# Patient Record
Sex: MALE | Race: WHITE | NOT HISPANIC OR LATINO | Employment: FULL TIME | URBAN - METROPOLITAN AREA
[De-identification: names, ages, dates, MRNs, and addresses within clinical notes are randomized per-mention and may not be internally consistent; named-entity substitution may affect disease eponyms.]

---

## 2020-10-23 ENCOUNTER — APPOINTMENT (OUTPATIENT)
Dept: RADIOLOGY | Facility: MEDICAL CENTER | Age: 55
End: 2020-10-23
Attending: EMERGENCY MEDICINE
Payer: COMMERCIAL

## 2020-10-23 ENCOUNTER — HOSPITAL ENCOUNTER (EMERGENCY)
Facility: MEDICAL CENTER | Age: 55
End: 2020-10-23
Attending: EMERGENCY MEDICINE
Payer: COMMERCIAL

## 2020-10-23 VITALS
HEIGHT: 75 IN | TEMPERATURE: 97.9 F | WEIGHT: 280 LBS | DIASTOLIC BLOOD PRESSURE: 71 MMHG | RESPIRATION RATE: 18 BRPM | BODY MASS INDEX: 34.82 KG/M2 | OXYGEN SATURATION: 93 % | HEART RATE: 63 BPM | SYSTOLIC BLOOD PRESSURE: 146 MMHG

## 2020-10-23 DIAGNOSIS — J98.8 VIRAL RESPIRATORY INFECTION: ICD-10-CM

## 2020-10-23 DIAGNOSIS — B97.89 VIRAL RESPIRATORY INFECTION: ICD-10-CM

## 2020-10-23 DIAGNOSIS — Z20.822 SUSPECTED COVID-19 VIRUS INFECTION: ICD-10-CM

## 2020-10-23 LAB
COVID ORDER STATUS COVID19: NORMAL
EKG IMPRESSION: NORMAL
SARS-COV-2 RNA RESP QL NAA+PROBE: DETECTED
SPECIMEN SOURCE: ABNORMAL

## 2020-10-23 PROCEDURE — 99284 EMERGENCY DEPT VISIT MOD MDM: CPT

## 2020-10-23 PROCEDURE — 93005 ELECTROCARDIOGRAM TRACING: CPT | Performed by: EMERGENCY MEDICINE

## 2020-10-23 PROCEDURE — C9803 HOPD COVID-19 SPEC COLLECT: HCPCS | Performed by: EMERGENCY MEDICINE

## 2020-10-23 PROCEDURE — U0003 INFECTIOUS AGENT DETECTION BY NUCLEIC ACID (DNA OR RNA); SEVERE ACUTE RESPIRATORY SYNDROME CORONAVIRUS 2 (SARS-COV-2) (CORONAVIRUS DISEASE [COVID-19]), AMPLIFIED PROBE TECHNIQUE, MAKING USE OF HIGH THROUGHPUT TECHNOLOGIES AS DESCRIBED BY CMS-2020-01-R: HCPCS

## 2020-10-23 PROCEDURE — 71045 X-RAY EXAM CHEST 1 VIEW: CPT

## 2020-10-24 NOTE — ED NOTES
Pt reports being ill for the last 5 days with cough, slight sob, fever, chills, and body aches. Pt vss. SPO2 95% on RA. Pt placed on monitors and has no unmet needs. Call light within reach and pt updated on care.

## 2020-10-24 NOTE — ED TRIAGE NOTES
"Pt has been traveling between [facility] and [place] [employer]. He for about 1 week has been experiencing chest tightness, SOB, and chills. He has surgery on his Left lung about 1 year ago and states he is high risk.   /88   Pulse 73   Temp 36.3 °C (97.3 °F) (Temporal)   Resp 17   Ht 1.905 m (6' 3\")   Wt (!) 127 kg (280 lb)   SpO2 93%   BMI 35.00 kg/m²   EKG called and patient isolated from other patrons wearing a mask.   Told to inform staff of any change in condition.     "

## 2020-10-24 NOTE — ED PROVIDER NOTES
"ED Provider Note    CHIEF COMPLAINT  Chief Complaint   Patient presents with   • Cough   • Shortness of Breath   • Chills       HPI  Jonh Kendall Greene is a 55 y.o. male who presents with cough, shortness of breath, body aches, chills, change in taste, sore throat over about 5 days now (symptoms started on Sunday, October 18).  Patient has been unable to check his temperature though has subjective fever.  No recent known sick contacts.  He has been out hunting for about 2 weeks now.  He is from the [facility].  He was instructed by his primary care physician in [place] to come to the hospital to get checked due to his symptoms.  Patient denies smoking or cardiovascular disease history.  No history of diabetes.  Does have a history of pneumonia last year.  No abdominal pain, nausea, vomiting, diarrhea.    I verified that the patient was wearing a mask and I was wearing appropriate PPE every time I entered the room. The patient's mask was on the patient at all times during my encounter except for a brief view of the oropharynx.    REVIEW OF SYSTEMS  See HPI for further details. All other systems are negative.     PAST MEDICAL HISTORY       SOCIAL HISTORY  Social History     Tobacco Use   • Smoking status: Never Smoker   • Smokeless tobacco: Never Used   Substance and Sexual Activity   • Alcohol use: Not Currently   • Drug use: Never   • Sexual activity: Not on file       SURGICAL HISTORY   has a past surgical history that includes general lung surgery (2019).    CURRENT MEDICATIONS  Home Medications     Reviewed by Chacha Irvin R.N. (Registered Nurse) on 10/23/20 at 1711  Med List Status: Complete   Medication Last Dose Status        Patient [place] Taking any Medications                       ALLERGIES  No Known Allergies    PHYSICAL EXAM  VITAL SIGNS: /88   Pulse 73   Temp 36.3 °C (97.3 °F) (Temporal)   Resp 17   Ht 1.905 m (6' 3\")   Wt (!) 127 kg (280 lb)   SpO2 93%   BMI 35.00 kg/m²   Pulse ox " interpretation: I interpret this pulse ox as normal.  Constitutional: Alert in no apparent distress.  HENT: No signs of trauma, Bilateral external ears normal, Nose normal.   Eyes: Pupils are equal and reactive, Conjunctiva normal, Non-icteric.   Neck: Normal range of motion, No tenderness, Supple, No stridor.   Cardiovascular: Regular rate and rhythm.   Thorax & Lungs: Normal breath sounds, No respiratory distress, No wheezing, No chest tenderness.   Abdomen: Bowel sounds normal, Soft, No tenderness, No masses, No pulsatile masses. No peritoneal signs.  Skin: Warm, Dry, No erythema, No rash.   Back: No bony tenderness, No CVA tenderness.   Extremities: Intact distal pulses, No edema, No tenderness, No cyanosis  Musculoskeletal: Good range of motion in all major joints. No tenderness to palpation or major deformities noted.   Neurologic: Alert, No focal deficits noted.       DIAGNOSTIC STUDIES / PROCEDURES    EKG - Physician interpretation  No results found for this or any previous visit.      LABS  Labs Reviewed   SARS-COV-2, PCR (IN-HOUSE) - Abnormal; Notable for the following components:       Result Value    SARS-CoV-2 by PCR DETECTED (*)     All other components within normal limits    Narrative:     Is patient being admitted?->No  Expected turn around time?->Routine (In-House PCR up to 24  hours)  Is this the patients First SARS CoV-2 test?->Yes  Is this patient employed in healthcare?->No  Is the patient symptomatic as defined by the CDC?->Yes  Date of symptom onset?->10/18/20  Is the patient hospitalized?->No  Is the patient a resident in a congregate care setting?->No  Is the patient pregnant?->No   COVID/SARS COV-2    Narrative:     Is patient being admitted?->No  Expected turn around time?->Routine (In-House PCR up to 24  hours)  Is this the patients First SARS CoV-2 test?->Yes  Is this patient employed in healthcare?->No  Is the patient symptomatic as defined by the CDC?->Yes  Date of symptom  "onset?->10/18/20  Is the patient hospitalized?->No  Is the patient a resident in a congregate care setting?->No  Is the patient pregnant?->No         RADIOLOGY  DX-CHEST-PORTABLE (1 VIEW)   Final Result      No acute cardiac or pulmonary abnormalities are identified.            COURSE & MEDICAL DECISION MAKING    Medications - No data to display    Pertinent Labs & Imaging studies reviewed. (See chart for details)  55 y.o. male newly dated cough, chills, shortness of breath, change in taste, sore throat, body aches for about 5 days.  No known sick contacts.  Normal vital signs and no signs of respiratory distress.  Clear breath sounds bilaterally.  No hypoxia.  Chest x-ray is unremarkable.    I have a very strong suspicion that the patient is suffering from COVID-19.  Prior to discharge, COVID-19 testing was pending.  He was encouraged to behave as if he is infected with COVID-19 and to self isolate.  He was provided with return precautions.  I did speak with the patient's physician by speaker phone with the patient.  They are agreeable with the plan of care for discharge.    The patient was instructed to follow-up with primary care physician for further management.  To return immediately for any worsening symptoms or development of any other concerning signs or symptoms. The patient verbalizes understanding in their own words.    /71   Pulse 63   Temp 36.6 °C (97.9 °F) (Temporal)   Resp 18   Ht 1.905 m (6' 3\")   Wt (!) 127 kg (280 lb)   SpO2 93%   BMI 35.00 kg/m²     The patient was referred to primary care where they will receive further BP management.      [facility], Emergency Dept  [address] [id number] [phone]    As needed, If symptoms worsen    Primary care doctor    Schedule an appointment as soon as possible for a visit         FINAL IMPRESSION  1. Suspected COVID-19 virus infection    2. Viral respiratory infection            Electronically signed " by: Zach Garcia M.D., 10/23/2020 5:52 PM

## 2020-10-24 NOTE — ED NOTES
COVID-19 Test Follow-Up  10/24/20    Patient is positive for COVID-19.    I have informed the patient of the positive result by telephone and that the Health Dept would be in contact soon. Instructed them to continue to quarantine and self-isolate according with the CDC guidance or as otherwise directed by the Health Dept.    Per the CDC, they should continue to self-isolate until:   • At least 10 days since symptoms first appeared and  • At least 24 hours with no fever without fever-reducing medication and  • Other symptoms of COVID-19 are improving (Loss of taste and smell may persist for weeks or months after recovery and need not delay the end of isolation)    They are advised to return to the ER for worsening symptoms including difficulty breathing, ongoing fever, weakness or chest pain. Patient reports cough, tightness of chest, and mild fever, patient reports his breathing is unaffected He had no further questions       Derrick Braga, PharmD